# Patient Record
Sex: FEMALE | Race: WHITE | HISPANIC OR LATINO | Employment: UNEMPLOYED | URBAN - METROPOLITAN AREA
[De-identification: names, ages, dates, MRNs, and addresses within clinical notes are randomized per-mention and may not be internally consistent; named-entity substitution may affect disease eponyms.]

---

## 2022-09-08 ENCOUNTER — OFFICE VISIT (OUTPATIENT)
Dept: OBSTETRICS AND GYNECOLOGY | Age: 32
End: 2022-09-08

## 2022-09-08 VITALS
WEIGHT: 199 LBS | DIASTOLIC BLOOD PRESSURE: 68 MMHG | BODY MASS INDEX: 36.62 KG/M2 | HEIGHT: 62 IN | SYSTOLIC BLOOD PRESSURE: 118 MMHG

## 2022-09-08 DIAGNOSIS — Z87.42 HISTORY OF ABNORMAL CERVICAL PAP SMEAR: ICD-10-CM

## 2022-09-08 DIAGNOSIS — Z3A.13 13 WEEKS GESTATION OF PREGNANCY: ICD-10-CM

## 2022-09-08 DIAGNOSIS — O36.80X0 ENCOUNTER TO DETERMINE FETAL VIABILITY OF PREGNANCY, SINGLE OR UNSPECIFIED FETUS: Primary | ICD-10-CM

## 2022-09-08 PROCEDURE — 99203 OFFICE O/P NEW LOW 30 MIN: CPT | Performed by: NURSE PRACTITIONER

## 2022-09-08 NOTE — PROGRESS NOTES
[facility]   Obstetrics and Gynecology       2022    Patient: You Lara          MR#:[id number]    History of Present Illness    Chief Complaint   Patient presents with   • Gynecologic Exam     Pregnancy confirmation, LMP 22       32 y.o. female  who presents for pregnancy confirmation.  She was seen in urgent care last week for evaluation of dark urine, nausea vomiting.  Small leukocytes on urine dip, no urine culture collected.  She was treated with cefdinir for 7 days.  Patient reports LMP was 2022 and did have spotting in early July.  Was diagnosed with a UTI at another facility at the same time she had the vaginal spotting.  No vaginal bleeding since then.  She does not currently have health insurance.  will work on getting established with Medicaid.  She moved here 3 years ago from [facility], undocumented immigrant.  Discussed community resources for undocumented immigrants in [place] including family health centers.  She reports FOB is a new partner, they first had intercourse at the end of .  He is questioning paternity.  She reports [facility] which was [facility].    Studies reviewed:  U/S shows viable first trimester IUP with FHR: 160    Patient's last menstrual period was 2022.  Obstetric History:  OB History        5    Para        Term                AB        Living   4       SAB        IAB        Ectopic        Molar        Multiple        Live Births                   Menstrual History:     Patient's last menstrual period was 2022.       Sexual History:       Brief Urine Lab Results  (Last result in the past 365 days)      Color   Clarity   Blood   Leuk Est   Nitrite   Protein   CREAT   Urine HCG        22 1312 Yellow   Clear   Negative   Small (1+)   Negative   Negative                ________________________________________  There is no problem list on file for this  "patient.    History reviewed. No pertinent past medical history.  History reviewed. No pertinent surgical history.  Social History     Tobacco Use   Smoking Status Never Smoker   Smokeless Tobacco Never Used     History reviewed. No pertinent family history.  Prior to Admission medications    Medication Sig Start Date End Date Taking? Authorizing Provider   cefdinir (OMNICEF) 300 MG capsule Take 1 capsule by mouth 2 (Two) Times a Day. 9/2/22   Roxanna Chen PA-C     ________________________________________    The following portions of the patient's history were reviewed and updated as appropriate: allergies, current medications, past family history, past medical history, past social history, past surgical history, and problem list.    Review of Systems   Constitutional: Negative for activity change, appetite change, chills, fatigue and fever.   Respiratory: Negative for cough and shortness of breath.    Cardiovascular: Negative for chest pain.   Gastrointestinal: Negative for constipation, diarrhea, nausea and vomiting.   Genitourinary: Negative for dysuria, flank pain, genital sores, hematuria, menstrual problem and vaginal bleeding.           Objective   Physical Exam  Vitals reviewed.   Constitutional:       Appearance: Normal appearance. She is normal weight.   HENT:      Head: Normocephalic and atraumatic.      Nose: Nose normal.      Mouth/Throat:      Mouth: Mucous membranes are moist.   Eyes:      Pupils: Pupils are equal, round, and reactive to light.   Pulmonary:      Effort: Pulmonary effort is normal.   Abdominal:      General: Abdomen is flat.      Palpations: Abdomen is soft.   Musculoskeletal:         General: Normal range of motion.      Cervical back: Normal range of motion and neck supple.   Skin:     General: Skin is warm and dry.   Neurological:      Mental Status: She is alert and oriented to person, place, and time.         /68   Ht 157.5 cm (62\")   Wt 90.3 kg (199 lb)   LMP " "06/04/2022   BMI 36.40 kg/m²    BP Readings from Last 3 Encounters:   09/08/22 118/68   09/02/22 103/67      Wt Readings from Last 3 Encounters:   09/08/22 90.3 kg (199 lb)        BMI: Estimated body mass index is 36.4 kg/m² as calculated from the following:    Height as of this encounter: 157.5 cm (62\").    Weight as of this encounter: 90.3 kg (199 lb).    Reviewed routine prenatal care with the office to include but not limited to expected weight gain during pregnancy, Tylenol products are fine, avoid aspirin and ibuprofen; not to change cat litter; food restrictions; avoidance of alcohol, tobacco, drugs and saunas/hot tubs, discussed that the COVID vaccine is safe and recommended in pregnancy.            Assessment:  Diagnoses and all orders for this visit:    1. Encounter to determine fetal viability of pregnancy, single or unspecified fetus (Primary)    2. 13 weeks gestation of pregnancy  -     OB Panel With HIV  -     Urine Culture - Urine, Urine, Clean Catch  -     Chlamydia trachomatis, Neisseria gonorrhoeae, Trichomonas vaginalis, PCR - Urine, Urine, Clean Catch    Gave phone number for passport and the [facility] in [place].  Discussed if she is not eligible for Medicaid that there is a sliding scale income based program for prenatal care at the [facility].    Encouraged her to return for Pap smear once insurance is established.    Plan:  Return in about 3 weeks (around 9/29/2022) for ob intake with dr. mccall, needs pap, working on getting insurance.    Trisha Yousif, APRN  9/8/2022 13:56 EDT   "

## 2022-09-09 LAB
ABO GROUP BLD: NORMAL
BASOPHILS # BLD AUTO: 0 X10E3/UL (ref 0–0.2)
BASOPHILS NFR BLD AUTO: 0 %
BLD GP AB SCN SERPL QL: NEGATIVE
EOSINOPHIL # BLD AUTO: 0.1 X10E3/UL (ref 0–0.4)
EOSINOPHIL NFR BLD AUTO: 1 %
ERYTHROCYTE [DISTWIDTH] IN BLOOD BY AUTOMATED COUNT: 12.6 % (ref 11.7–15.4)
HBV SURFACE AG SERPL QL IA: NEGATIVE
HCT VFR BLD AUTO: 39.8 % (ref 34–46.6)
HCV AB S/CO SERPL IA: <0.1 S/CO RATIO (ref 0–0.9)
HGB BLD-MCNC: 13.2 G/DL (ref 11.1–15.9)
HIV 1+2 AB+HIV1 P24 AG SERPL QL IA: NON REACTIVE
IMM GRANULOCYTES # BLD AUTO: 0 X10E3/UL (ref 0–0.1)
IMM GRANULOCYTES NFR BLD AUTO: 1 %
LYMPHOCYTES # BLD AUTO: 1.5 X10E3/UL (ref 0.7–3.1)
LYMPHOCYTES NFR BLD AUTO: 22 %
MCH RBC QN AUTO: 29.9 PG (ref 26.6–33)
MCHC RBC AUTO-ENTMCNC: 33.2 G/DL (ref 31.5–35.7)
MCV RBC AUTO: 90 FL (ref 79–97)
MONOCYTES # BLD AUTO: 0.5 X10E3/UL (ref 0.1–0.9)
MONOCYTES NFR BLD AUTO: 8 %
NEUTROPHILS # BLD AUTO: 4.5 X10E3/UL (ref 1.4–7)
NEUTROPHILS NFR BLD AUTO: 68 %
PLATELET # BLD AUTO: 237 X10E3/UL (ref 150–450)
RBC # BLD AUTO: 4.41 X10E6/UL (ref 3.77–5.28)
RH BLD: POSITIVE
RPR SER QL: NON REACTIVE
RUBV IGG SERPL IA-ACNC: 3.91 INDEX
WBC # BLD AUTO: 6.6 X10E3/UL (ref 3.4–10.8)

## 2022-09-10 LAB
BACTERIA UR CULT: NO GROWTH
BACTERIA UR CULT: NORMAL
C TRACH RRNA SPEC QL NAA+PROBE: NEGATIVE
N GONORRHOEA RRNA SPEC QL NAA+PROBE: NEGATIVE
T VAGINALIS RRNA SPEC QL NAA+PROBE: POSITIVE

## 2022-09-12 DIAGNOSIS — O23.592 TRICHOMONAL VAGINITIS DURING PREGNANCY IN SECOND TRIMESTER: Primary | ICD-10-CM

## 2022-09-12 DIAGNOSIS — A59.01 TRICHOMONAL VAGINITIS DURING PREGNANCY IN SECOND TRIMESTER: Primary | ICD-10-CM

## 2022-09-12 RX ORDER — METRONIDAZOLE 500 MG/1
2000 TABLET ORAL ONCE
Qty: 4 TABLET | Refills: 0 | Status: SHIPPED | OUTPATIENT
Start: 2022-09-12 | End: 2022-09-12